# Patient Record
Sex: FEMALE
[De-identification: names, ages, dates, MRNs, and addresses within clinical notes are randomized per-mention and may not be internally consistent; named-entity substitution may affect disease eponyms.]

---

## 2024-08-29 ENCOUNTER — TRANSCRIBE ORDERS (OUTPATIENT)
Dept: OTHER | Age: 39
End: 2024-08-29

## 2024-08-29 ENCOUNTER — TELEPHONE (OUTPATIENT)
Dept: OPHTHALMOLOGY | Facility: CLINIC | Age: 39
End: 2024-08-29
Payer: COMMERCIAL

## 2024-08-29 DIAGNOSIS — D31.32 CHOROIDAL NEVUS OF LEFT EYE: Primary | ICD-10-CM

## 2024-08-29 NOTE — TELEPHONE ENCOUNTER
[facility] Call Center    Phone Message    May a detailed message be left on voicemail: yes     Reason for Call: Appointment Intake    Referring Provider Name: Referred by: Shawn Ronan, MD [facility]  [address] [place] [address]  Phone: [phone]  Fax: [phone]       Diagnosis and/or Symptoms: Choroidal nevus of left eye , Enlarged choroidal nevus left eye, schedule within a month     Requesting Dr. Cortez    I did reach out to the pt, she would like to schedule, please review    Action Taken: Message routed to:  [facility] eye    Travel Screening: Not Applicable     Date of Service:

## 2024-09-03 NOTE — TELEPHONE ENCOUNTER
Pt scheduled 9/23 - had spot held for pt. KT called and informed pt of appt date/time/location.    ALEJA Jean Baptiste 11:42 AM September 3, 2024

## 2024-09-11 DIAGNOSIS — D31.32 CHOROIDAL NEVUS, LEFT: Primary | ICD-10-CM

## 2024-09-22 NOTE — PROGRESS NOTES
CC -   Suspicious choroidal nevus OS    INTERVAL HISTORY - Initial visit with me. Referred by Fan 8/2029 for choroidal nevus OS. Seen by Fan 2017, supect growth in 2024 c/t 2017    PMH -   Denae Schroeder is a  39 year old year-old patient with history of choroidal nevus OS with concern for growth 8/2024  Seen by Fan 2017 and in 8/2024 concern for growth at 8/2024 visit        PAST OCULAR SURGERY  None    RETINAL IMAGING:  OCT 09/23/24   OD -  macula - retina normal, PHF attached  OS -   macula - retina normal, PHF attached  nevus - mild elevation, thickness 992 um (9/23/24)    U/S OS  A-scan - high reflectivity  B-scan - lesion, tsize measured (to outer wall) 1.59mm x 5.34L x 5.09T (09/2024)     ASSESSMENT & PLAN      # Choroidal nevus OS   - seen by Fan 2017 with photos    - mild size increase noted 8/2024     - Optos 9/2024 c/t PN images from 2017 shows slight increase in superior border   - increase is less than 0.7 mm superior border over 7 years   - thickness is ~1.3 mm on U/S and < 1 mm on OCT  - no fluid, no orange, far from ONH     - low likelihood of being melanoma   - monitor as precaution   - recheck 4 months      # syneresis OU      Return in about 4 months (around 1/23/2025) for DFE OS, OCT OU, Optos Photo, Clarus Photo.       ATTESTATION     Attending Attestation:     Complete documentation of historical and exam elements from today's encounter can be found in the full encounter summary report (not reduplicated in this progress note).  I personally obtained the chief complaint(s) and history of present illness.  I confirmed and edited as necessary the review of systems, past medical/surgical history, family history, social history, and examination findings as documented by others; and I examined the patient myself.  I personally reviewed the relevant tests, images, and reports as documented above.  I formulated and edited as necessary the assessment and plan and discussed the findings and  management plan with the patient and family    Gabi Cortez MD, PhD  , Vitreoretinal Surgery  Department of Ophthalmology  [facility]

## 2024-09-23 ENCOUNTER — OFFICE VISIT (OUTPATIENT)
Dept: OPHTHALMOLOGY | Facility: CLINIC | Age: 39
End: 2024-09-23
Attending: OPHTHALMOLOGY
Payer: COMMERCIAL

## 2024-09-23 DIAGNOSIS — D31.32 CHOROIDAL NEVUS, LEFT: ICD-10-CM

## 2024-09-23 PROCEDURE — 99204 OFFICE O/P NEW MOD 45 MIN: CPT | Performed by: OPHTHALMOLOGY

## 2024-09-23 PROCEDURE — 99207 FUNDUS PHOTOS OU (BOTH EYES): CPT | Mod: 26 | Performed by: OPHTHALMOLOGY

## 2024-09-23 PROCEDURE — G0463 HOSPITAL OUTPT CLINIC VISIT: HCPCS | Performed by: OPHTHALMOLOGY

## 2024-09-23 PROCEDURE — 92134 CPTRZ OPH DX IMG PST SGM RTA: CPT | Performed by: OPHTHALMOLOGY

## 2024-09-23 PROCEDURE — 76510 OPH US DX B-SCAN&QUAN A-SCAN: CPT | Performed by: OPHTHALMOLOGY

## 2024-09-23 PROCEDURE — 92250 FUNDUS PHOTOGRAPHY W/I&R: CPT | Performed by: OPHTHALMOLOGY

## 2024-09-23 ASSESSMENT — CONF VISUAL FIELD
OD_SUPERIOR_NASAL_RESTRICTION: 0
METHOD: COUNTING FINGERS
OD_INFERIOR_TEMPORAL_RESTRICTION: 0
OD_NORMAL: 1
OS_INFERIOR_TEMPORAL_RESTRICTION: 0
OS_INFERIOR_NASAL_RESTRICTION: 0
OS_SUPERIOR_NASAL_RESTRICTION: 0
OS_NORMAL: 1
OD_INFERIOR_NASAL_RESTRICTION: 0
OS_SUPERIOR_TEMPORAL_RESTRICTION: 0
OD_SUPERIOR_TEMPORAL_RESTRICTION: 0

## 2024-09-23 ASSESSMENT — SLIT LAMP EXAM - LIDS
COMMENTS: NORMAL
COMMENTS: NORMAL

## 2024-09-23 ASSESSMENT — VISUAL ACUITY
OS_SC: 20/20
METHOD: SNELLEN - LINEAR
OD_SC: 20/20

## 2024-09-23 ASSESSMENT — EXTERNAL EXAM - LEFT EYE: OS_EXAM: NORMAL

## 2024-09-23 ASSESSMENT — TONOMETRY
OD_IOP_MMHG: 14
IOP_METHOD: TONOPEN
OS_IOP_MMHG: 13

## 2024-09-23 ASSESSMENT — CUP TO DISC RATIO
OD_RATIO: 0.25
OS_RATIO: 0.25

## 2024-09-23 ASSESSMENT — EXTERNAL EXAM - RIGHT EYE: OD_EXAM: NORMAL

## 2024-09-23 NOTE — LETTER
9/23/2024       RE: Denae Schroeder  [address]  [place] [address]     Dear Colleague,    Thank you for referring your patient, Denae Schroeder, to the [facility] [place] at [facility]. Please see a copy of my visit note below.    CC -   Suspicious choroidal nevus OS    INTERVAL HISTORY - Initial visit with me. Referred by Fan 8/2029 for choroidal nevus OS. Seen by Fan 2017, supect growth in 2024 c/t 2017    PMH -  Denae Schroeder is a  39 year old year-old patient with history of choroidal nevus OS with concern for growth 8/2024  Seen by Fan 2017 and in 8/2024 concern for growth at 8/2024 visit    PAST OCULAR SURGERY  None    RETINAL IMAGING:  OCT 09/23/24   OD -  macula - retina normal, PHF attached  OS -   macula - retina normal, PHF attached  nevus - mild elevation, thickness 992 um (9/23/24)    U/S OS  A-scan - high reflectivity  B-scan - lesion, tsize measured (to outer wall) 1.59mm x 5.34L x 5.09T (09/2024)     ASSESSMENT & PLAN  # Choroidal nevus OS   - seen by Fan 2017 with photos    - mild size increase noted 8/2024     - Optos 9/2024 c/t PN images from 2017 shows slight increase in superior border   - increase is less than 0.7 mm superior border over 7 years   - thickness is ~1.3 mm on U/S and < 1 mm on OCT  - no fluid, no orange, far from ONH     - low likelihood of being melanoma   - monitor as precaution   - recheck 4 months    # syneresis OU    Return in about 4 months (around 1/23/2025) for DFE OS, OCT OU, Optos Photo, Clarus Photo.     ATTESTATION   Attending Attestation: Complete documentation of historical and exam elements from today's encounter can be found in the full encounter summary report (not reduplicated in this progress note).  I personally obtained the chief complaint(s) and history of present illness.  I confirmed and edited as necessary the review of systems, past medical/surgical history, family history, social history, and examination  findings as documented by others; and I examined the patient myself.  I personally reviewed the relevant tests, images, and reports as documented above.  I formulated and edited as necessary the assessment and plan and discussed the findings and management plan with the patient and family. Gabi Cortez MD, PhD    Base Eye Exam       Visual Acuity (Snellen - Linear)         Right Left    Dist sc 20/20 20/20              Tonometry (Tonopen, 1:05 PM)         Right Left    Pressure 14 13              Pupils         Dark Light Shape React APD    Right 5 4 Round Brisk None    Left 5 4 Round Brisk None              Visual Fields (Counting fingers)         Left Right     Full Full              Extraocular Movement         Right Left     Full Full              Neuro/Psych       Oriented x3: Yes    Mood/Affect: Normal              Dilation       Both eyes: 1.0% Mydriacyl, 2.5% Morris Synephrine @ 1:07 PM                  Slit Lamp and Fundus Exam       External Exam         Right Left    External Normal Normal              Slit Lamp Exam         Right Left    Lids/Lashes Normal Normal    Conjunctiva/Sclera White and quiet White and quiet    Cornea Clear Clear    Anterior Chamber Deep and quiet Deep and quiet    Iris Dilated Dilated    Lens clear phakic clear phakic    Vitreous syneresis syneresis              Fundus Exam         Right Left    Disc Normal Normal    C/D Ratio 0.25 0.25    Macula Normal Normal    Vessels Normal Normal    Periphery Normal flat pig nevus temporal with drusen, size ~6x6 mm                  Again, thank you for allowing me to participate in the care of your patient.      Sincerely,    Gabi Cortez MD, PhD  , Vitreoretinal Surgery  Department of Ophthalmology & Visual Neurosciences  [facility]

## 2024-09-23 NOTE — NURSING NOTE
Chief Complaints and History of Present Illnesses   Patient presents with    Retinal Evaluation     ?Nevus left eye   Ref by Dr Llanes        Chief Complaint(s) and History of Present Illness(es)       Retinal Evaluation              Comments: ?Nevus left eye   Ref by Dr Llanes                 Comments    Pt states she was first told about a freckle in her eye 7 years ago   noticed a change in the nevus  No flashes or floaters   No eye pain or reddness    Gita Hernandez COT 12:57 PM September 23, 2024

## 2024-10-06 ENCOUNTER — HEALTH MAINTENANCE LETTER (OUTPATIENT)
Age: 39
End: 2024-10-06

## 2025-01-29 DIAGNOSIS — D31.32 CHOROIDAL NEVUS, LEFT: Primary | ICD-10-CM

## 2025-02-11 ENCOUNTER — OFFICE VISIT (OUTPATIENT)
Dept: OPHTHALMOLOGY | Facility: CLINIC | Age: 40
End: 2025-02-11
Attending: OPHTHALMOLOGY
Payer: COMMERCIAL

## 2025-02-11 DIAGNOSIS — D31.32 CHOROIDAL NEVUS, LEFT: ICD-10-CM

## 2025-02-11 PROCEDURE — G0463 HOSPITAL OUTPT CLINIC VISIT: HCPCS | Performed by: OPHTHALMOLOGY

## 2025-02-11 PROCEDURE — 92134 CPTRZ OPH DX IMG PST SGM RTA: CPT | Performed by: OPHTHALMOLOGY

## 2025-02-11 PROCEDURE — 92250 FUNDUS PHOTOGRAPHY W/I&R: CPT | Performed by: OPHTHALMOLOGY

## 2025-02-11 ASSESSMENT — CONF VISUAL FIELD
OS_INFERIOR_TEMPORAL_RESTRICTION: 0
OS_INFERIOR_NASAL_RESTRICTION: 0
OS_SUPERIOR_NASAL_RESTRICTION: 0
METHOD: COUNTING FINGERS
OS_NORMAL: 1
OS_SUPERIOR_TEMPORAL_RESTRICTION: 0

## 2025-02-11 ASSESSMENT — CUP TO DISC RATIO
OS_RATIO: 0.25
OD_RATIO: 0.25

## 2025-02-11 ASSESSMENT — VISUAL ACUITY
METHOD: SNELLEN - LINEAR
OD_SC: 20/20
OS_SC: 20/20

## 2025-02-11 ASSESSMENT — SLIT LAMP EXAM - LIDS
COMMENTS: NORMAL
COMMENTS: NORMAL

## 2025-02-11 ASSESSMENT — TONOMETRY
OS_IOP_MMHG: 15
IOP_METHOD: TONOPEN
OD_IOP_MMHG: 12

## 2025-02-11 ASSESSMENT — EXTERNAL EXAM - RIGHT EYE: OD_EXAM: NORMAL

## 2025-02-11 ASSESSMENT — EXTERNAL EXAM - LEFT EYE: OS_EXAM: NORMAL

## 2025-02-11 NOTE — PROGRESS NOTES
"CC -   Suspicious choroidal nevus OS    INTERVAL HISTORY - Pt denies vision changes or new concerns.     PMH -   Denae Schroeder is a 39 year old patient with history of choroidal nevus OS with concern for growth 8/2024  Seen by Fan 2017 and in 8/2024 concern for growth at 8/2024 visit. Referred by Fan 8/2024 for choroidal nevus OS. Seen by Fan 2017, supect growth in 2024 c/t 2017    PAST OCULAR SURGERY  None    RETINAL IMAGING:  OCT mac 02/11/25    OD -  macula - retina normal, PHF attached  OS -   macula - retina normal, PHF attached  nevus - mild elevation, thickness 992 um (9/23/24); 960 um (02/11/25 scan 21); few overlying mixed PEDs    U/S OS  A-scan - high reflectivity  B-scan - lesion, tsize measured (to outer wall) 1.59mm x 5.34L x 5.09T (09/2024)     Optos 02/11/25   OD WNL  OS ST CN with mildly large halo interval; FAF with few hyperAF deposits    ASSESSMENT & PLAN      # Choroidal nevus OS   - seen by Fan Gonzalez with photos    - mild size increase noted 8/2024     - Optos 9/2024 c/t PN images from 2017 shows slight increase in superior border   - increase is less than 0.7 mm superior border over 7 years   - thickness is ~1.3 mm on U/S and < 1 mm on OCT  - no fluid, no orange, far from ONH    - 2/11/25 thickness and OCT scans very similar  - new \"halo\" of mildly increased pigmentation surrounding neuvs uncertain significance  - visible on IR photos and color photos    - will continue to monitor closely  - recheck 3-4 months        # syneresis OU      Return in about 3 months (around 5/11/2025) for DFE OS, US OS, OCT OU, OCT 55 deree, Clarus Photo, Optos Photo.     John Sprague MD  Vitreoretinal Surgery Fellow       ATTESTATION     Attending Attestation:     Complete documentation of historical and exam elements from today's encounter can be found in the full encounter summary report (not reduplicated in this progress note).  I personally obtained the chief complaint(s) and history of present " illness.  I confirmed and edited as necessary the review of systems, past medical/surgical history, family history, social history, and examination findings as documented by others; and I examined the patient myself.  I personally reviewed the relevant tests, images, and reports as documented above.  I personally reviewed the ophthalmic test(s) associated with this encounter, agree with the interpretation(s) as documented by the resident/fellow, and have edited the corresponding report(s) as necessary.   I formulated and edited as necessary the assessment and plan and discussed the findings and management plan with the patient and family    Gabi Cortez MD, PhD  , Vitreoretinal Surgery  Department of Ophthalmology  [facility]

## 2025-02-11 NOTE — NURSING NOTE
Chief Complaints and History of Present Illnesses   Patient presents with    Follow Up     Chief Complaint(s) and History of Present Illness(es)       Follow Up              Course: stable    Associated symptoms: Negative for eye pain, redness, flashes and floaters    Treatments tried: no treatments              Comments    Pt denies vision changes or new concerns.     Tory Haley OA 1:34 PM February 11, 2025

## 2025-05-01 DIAGNOSIS — D31.32 CHOROIDAL NEVUS, LEFT: Primary | ICD-10-CM

## 2025-05-13 ENCOUNTER — OFFICE VISIT (OUTPATIENT)
Dept: OPHTHALMOLOGY | Facility: CLINIC | Age: 40
End: 2025-05-13
Attending: OPHTHALMOLOGY
Payer: COMMERCIAL

## 2025-05-13 DIAGNOSIS — D31.32 CHOROIDAL NEVUS, LEFT: ICD-10-CM

## 2025-05-13 PROCEDURE — 99214 OFFICE O/P EST MOD 30 MIN: CPT | Mod: GC | Performed by: OPHTHALMOLOGY

## 2025-05-13 PROCEDURE — 92250 FUNDUS PHOTOGRAPHY W/I&R: CPT | Performed by: OPHTHALMOLOGY

## 2025-05-13 PROCEDURE — G0463 HOSPITAL OUTPT CLINIC VISIT: HCPCS | Performed by: OPHTHALMOLOGY

## 2025-05-13 PROCEDURE — 92134 CPTRZ OPH DX IMG PST SGM RTA: CPT | Performed by: OPHTHALMOLOGY

## 2025-05-13 PROCEDURE — 76510 OPH US DX B-SCAN&QUAN A-SCAN: CPT | Performed by: OPHTHALMOLOGY

## 2025-05-13 PROCEDURE — 99207 FUNDUS PHOTOS OU (BOTH EYES): CPT | Mod: 26 | Performed by: OPHTHALMOLOGY

## 2025-05-13 ASSESSMENT — CUP TO DISC RATIO: OS_RATIO: 0.25

## 2025-05-13 ASSESSMENT — VISUAL ACUITY
OD_SC+: +2
OD_SC: 20/20
OS_SC+: +2
OS_SC: 20/20
METHOD: SNELLEN - LINEAR

## 2025-05-13 ASSESSMENT — CONF VISUAL FIELD
OS_INFERIOR_NASAL_RESTRICTION: 0
OS_SUPERIOR_NASAL_RESTRICTION: 0
OS_INFERIOR_TEMPORAL_RESTRICTION: 0
OD_NORMAL: 1
OD_SUPERIOR_NASAL_RESTRICTION: 0
OD_INFERIOR_NASAL_RESTRICTION: 0
OD_INFERIOR_TEMPORAL_RESTRICTION: 0
OD_SUPERIOR_TEMPORAL_RESTRICTION: 0
OS_SUPERIOR_TEMPORAL_RESTRICTION: 0
METHOD: COUNTING FINGERS
OS_NORMAL: 1

## 2025-05-13 ASSESSMENT — SLIT LAMP EXAM - LIDS
COMMENTS: NORMAL
COMMENTS: NORMAL

## 2025-05-13 ASSESSMENT — TONOMETRY
OD_IOP_MMHG: 17
IOP_METHOD: TONOPEN
OS_IOP_MMHG: 17

## 2025-05-13 ASSESSMENT — EXTERNAL EXAM - RIGHT EYE: OD_EXAM: NORMAL

## 2025-05-13 ASSESSMENT — EXTERNAL EXAM - LEFT EYE: OS_EXAM: NORMAL

## 2025-05-13 NOTE — NURSING NOTE
Chief Complaints and History of Present Illnesses   Patient presents with    Follow Up     Chief Complaint(s) and History of Present Illness(es)       Follow Up              Course: stable    Associated symptoms: Negative for eye pain, flashes, floaters, discharge and swelling    Treatments tried: no treatments              Comments    Pt denies vision changes or new concerns.     Tory Haley OA 1:27 PM May 13, 2025

## 2025-05-13 NOTE — PROGRESS NOTES
"CC -   Suspicious choroidal nevus OS    INTERVAL HISTORY - No vision changes. No new flashes or floaters.      PMH -   Denae Schroeder is a 40 year old patient with history of choroidal nevus OS with concern for growth 8/2024  Seen by Fan 2017 and in 8/2024 concern for growth at 8/2024 visit. Referred by Fan 8/2024 for choroidal nevus OS. Seen by Fan 2017, supect growth in 2024 c/t 2017    PAST OCULAR SURGERY  None    RETINAL IMAGING:  OCT mac 5/13/25    OD -  macula - retina normal, PHF attached  OS -   macula - retina normal, PHF attached  nevus - mild elevation, thickness 1004 um (scan #13 9/23/24); 960 um (02/11/25 scan 21); few overlying mixed PEDs; 906 um (scan # 13 5/13/25)    U/S OS  A-scan - high reflectivity  B-scan - lesion, tsize measured (to outer wall) 1.59mm x 5.34L x 5.09T (09/2024)  -> 1.19mm ( outer wall 1.54mm )x 7.04L x 5.98T (05/2025)    Optos 5/13/25   OD WNL  OS ST CN with less prominent halo ; FAF with few hyperAF deposits    ASSESSMENT & PLAN      # Choroidal nevus OS   - seen by Fan 2017 with photos    - mild size increase noted 8/2024     - Optos 9/2024 c/t PN images from 2017 shows slight increase in superior border   - increase is less than 0.7 mm superior border over 7 years   - thickness is ~1.3 mm on U/S and < 1 mm on OCT  - no fluid, no orange, far from ONH    - 2/11/25 thickness and OCT scans very similar  - new \"halo\" of mildly increased pigmentation surrounding neuvs uncertain significance  - visible on IR photos and color photos    - 5/13/25: stable on optos and OCT, less prominent halo  - will continue to monitor closely  - recheck 6  months if stable extend        # syneresis OU      Return in about 6 months (around 11/13/2025) for DFE OS, OCT OU, Clarus Photo, Optos Photo.     Bill Vásquez MD  PGY-3 Ophthalmology Resident  [facility]        ATTESTATION     Attending Attestation:     Complete documentation of historical and exam elements from today's " encounter can be found in the full encounter summary report (not reduplicated in this progress note).  I personally obtained the chief complaint(s) and history of present illness.  I confirmed and edited as necessary the review of systems, past medical/surgical history, family history, social history, and examination findings as documented by others; and I examined the patient myself.  I personally reviewed the relevant tests, images, and reports as documented above.  I personally reviewed the ophthalmic test(s) associated with this encounter, agree with the interpretation(s) as documented by the resident/fellow, and have edited the corresponding report(s) as necessary.   I formulated and edited as necessary the assessment and plan and discussed the findings and management plan with the patient and family    Gabi Cortez MD, PhD  , Vitreoretinal Surgery  Department of Ophthalmology  [facility]